# Patient Record
Sex: MALE | Race: WHITE | Employment: OTHER | URBAN - METROPOLITAN AREA
[De-identification: names, ages, dates, MRNs, and addresses within clinical notes are randomized per-mention and may not be internally consistent; named-entity substitution may affect disease eponyms.]

---

## 2017-04-18 PROBLEM — Z86.73 HISTORY OF CVA (CEREBROVASCULAR ACCIDENT): Chronic | Status: ACTIVE | Noted: 2017-04-18

## 2017-04-18 PROBLEM — N18.30 CKD (CHRONIC KIDNEY DISEASE) STAGE 3, GFR 30-59 ML/MIN (HCC): Chronic | Status: ACTIVE | Noted: 2017-04-18

## 2017-04-18 PROBLEM — Z79.01 CHRONIC ANTICOAGULATION: Chronic | Status: ACTIVE | Noted: 2017-04-18

## 2017-04-18 PROBLEM — N40.0 BPH (BENIGN PROSTATIC HYPERPLASIA): Chronic | Status: ACTIVE | Noted: 2017-04-18

## 2017-04-18 PROBLEM — I50.41 SYSTOLIC AND DIASTOLIC CHF, ACUTE (HCC): Status: ACTIVE | Noted: 2017-04-18

## 2017-04-18 PROBLEM — N17.9 AKI (ACUTE KIDNEY INJURY) (HCC): Status: ACTIVE | Noted: 2017-04-18

## 2017-04-18 PROBLEM — I67.9 CEREBROVASCULAR DISEASE: Chronic | Status: ACTIVE | Noted: 2017-04-18

## 2017-04-18 PROBLEM — D64.9 ANEMIA: Status: ACTIVE | Noted: 2017-04-18

## 2017-04-18 PROBLEM — I34.0 MITRAL REGURGITATION: Chronic | Status: ACTIVE | Noted: 2017-04-18

## 2017-04-21 PROBLEM — K22.2 SCHATZKI'S RING: Status: ACTIVE | Noted: 2017-04-21

## 2017-04-21 PROBLEM — K21.9 GERD (GASTROESOPHAGEAL REFLUX DISEASE): Status: ACTIVE | Noted: 2017-04-21

## 2017-04-21 PROBLEM — K26.9 MULTIPLE DUODENAL ULCERS: Status: ACTIVE | Noted: 2017-04-21

## 2017-05-18 PROBLEM — D68.32 WARFARIN-INDUCED COAGULOPATHY (HCC): Status: ACTIVE | Noted: 2017-05-18

## 2017-05-18 PROBLEM — K21.9 GERD (GASTROESOPHAGEAL REFLUX DISEASE): Chronic | Status: ACTIVE | Noted: 2017-04-21

## 2017-05-18 PROBLEM — T45.515A WARFARIN-INDUCED COAGULOPATHY (HCC): Status: ACTIVE | Noted: 2017-05-18

## 2017-05-27 PROBLEM — E78.2 MIXED HYPERLIPIDEMIA: Chronic | Status: ACTIVE | Noted: 2017-05-27

## 2017-05-27 PROBLEM — K22.2 SCHATZKI'S RING: Chronic | Status: ACTIVE | Noted: 2017-04-21

## 2017-05-27 PROBLEM — I50.42 CHRONIC COMBINED SYSTOLIC AND DIASTOLIC CHF (CONGESTIVE HEART FAILURE) (HCC): Chronic | Status: ACTIVE | Noted: 2017-05-27

## 2017-05-27 PROBLEM — K26.9 MULTIPLE DUODENAL ULCERS: Status: RESOLVED | Noted: 2017-04-21 | Resolved: 2017-05-27

## 2017-05-27 PROBLEM — I10 HTN (HYPERTENSION), BENIGN: Chronic | Status: ACTIVE | Noted: 2017-05-27

## 2017-05-28 PROBLEM — K22.2 SCHATZKI'S RING: Chronic | Status: RESOLVED | Noted: 2017-04-21 | Resolved: 2017-05-28

## 2017-05-28 PROBLEM — I34.0 MITRAL REGURGITATION: Chronic | Status: RESOLVED | Noted: 2017-04-18 | Resolved: 2017-05-28

## 2017-05-28 PROBLEM — Z79.01 CHRONIC ANTICOAGULATION: Chronic | Status: RESOLVED | Noted: 2017-04-18 | Resolved: 2017-05-28

## 2018-03-06 DIAGNOSIS — I48.91 ATRIAL FIBRILLATION, UNSPECIFIED TYPE (HCC): ICD-10-CM

## 2018-03-11 DIAGNOSIS — I48.91 ATRIAL FIBRILLATION, UNSPECIFIED TYPE (HCC): ICD-10-CM

## 2018-03-12 ENCOUNTER — HOSPITAL ENCOUNTER (OUTPATIENT)
Dept: PHARMACY | Age: 83
Setting detail: THERAPIES SERIES
Discharge: HOME | End: 2018-03-12
Payer: MEDICARE

## 2018-03-12 VITALS
BODY MASS INDEX: 26.33 KG/M2 | DIASTOLIC BLOOD PRESSURE: 73 MMHG | HEART RATE: 68 BPM | SYSTOLIC BLOOD PRESSURE: 132 MMHG | WEIGHT: 173.2 LBS

## 2018-03-12 DIAGNOSIS — I48.91 ATRIAL FIBRILLATION, UNSPECIFIED TYPE (HCC): ICD-10-CM

## 2018-03-12 LAB — INTERNATIONAL NORMALIZATION RATIO, POC: 2.9

## 2018-03-12 PROCEDURE — 85610 PROTHROMBIN TIME: CPT

## 2018-03-12 PROCEDURE — 99211 OFF/OP EST MAY X REQ PHY/QHP: CPT

## 2018-03-12 RX ORDER — INSULIN GLARGINE 100 [IU]/ML
27 INJECTION, SOLUTION SUBCUTANEOUS EVERY MORNING
COMMUNITY

## 2018-03-28 ENCOUNTER — HOSPITAL ENCOUNTER (OUTPATIENT)
Dept: PHARMACY | Age: 83
Setting detail: THERAPIES SERIES
Discharge: HOME | End: 2018-03-28
Payer: MEDICARE

## 2018-03-28 VITALS
WEIGHT: 176 LBS | BODY MASS INDEX: 26.76 KG/M2 | DIASTOLIC BLOOD PRESSURE: 63 MMHG | HEART RATE: 83 BPM | SYSTOLIC BLOOD PRESSURE: 136 MMHG

## 2018-03-28 DIAGNOSIS — I48.91 ATRIAL FIBRILLATION, UNSPECIFIED TYPE (HCC): ICD-10-CM

## 2018-03-28 LAB — INTERNATIONAL NORMALIZATION RATIO, POC: 2.6

## 2018-03-28 PROCEDURE — 99211 OFF/OP EST MAY X REQ PHY/QHP: CPT

## 2018-03-28 PROCEDURE — 85610 PROTHROMBIN TIME: CPT

## 2018-03-28 RX ORDER — WARFARIN SODIUM 5 MG/1
5 TABLET ORAL DAILY
COMMUNITY
End: 2019-02-25 | Stop reason: SDUPTHER

## 2018-04-12 ENCOUNTER — TELEPHONE (OUTPATIENT)
Dept: ADMINISTRATIVE | Age: 83
End: 2018-04-12

## 2018-04-25 ENCOUNTER — HOSPITAL ENCOUNTER (OUTPATIENT)
Dept: PHARMACY | Age: 83
Setting detail: THERAPIES SERIES
Discharge: HOME | End: 2018-04-25
Payer: MEDICARE

## 2018-04-25 VITALS
BODY MASS INDEX: 26.64 KG/M2 | SYSTOLIC BLOOD PRESSURE: 127 MMHG | HEART RATE: 83 BPM | WEIGHT: 175.2 LBS | DIASTOLIC BLOOD PRESSURE: 66 MMHG

## 2018-04-25 DIAGNOSIS — I48.91 ATRIAL FIBRILLATION, UNSPECIFIED TYPE (HCC): ICD-10-CM

## 2018-04-25 LAB — INTERNATIONAL NORMALIZATION RATIO, POC: 2.3

## 2018-04-25 PROCEDURE — 99211 OFF/OP EST MAY X REQ PHY/QHP: CPT

## 2018-04-25 PROCEDURE — 85610 PROTHROMBIN TIME: CPT

## 2018-05-09 ENCOUNTER — OFFICE VISIT (OUTPATIENT)
Dept: CARDIOLOGY CLINIC | Age: 83
End: 2018-05-09
Payer: MEDICARE

## 2018-05-09 VITALS
RESPIRATION RATE: 16 BRPM | DIASTOLIC BLOOD PRESSURE: 60 MMHG | SYSTOLIC BLOOD PRESSURE: 118 MMHG | WEIGHT: 175.7 LBS | HEIGHT: 68 IN | HEART RATE: 75 BPM | BODY MASS INDEX: 26.63 KG/M2

## 2018-05-09 DIAGNOSIS — I10 HTN (HYPERTENSION), BENIGN: Primary | Chronic | ICD-10-CM

## 2018-05-09 PROCEDURE — 99214 OFFICE O/P EST MOD 30 MIN: CPT | Performed by: INTERNAL MEDICINE

## 2018-05-09 PROCEDURE — 93000 ELECTROCARDIOGRAM COMPLETE: CPT | Performed by: INTERNAL MEDICINE

## 2018-05-23 ENCOUNTER — TELEPHONE (OUTPATIENT)
Dept: PHARMACY | Age: 83
End: 2018-05-23

## 2018-05-23 ENCOUNTER — HOSPITAL ENCOUNTER (OUTPATIENT)
Dept: PHARMACY | Age: 83
Setting detail: THERAPIES SERIES
Discharge: HOME | End: 2018-05-23
Payer: MEDICARE

## 2018-05-23 VITALS
WEIGHT: 175.8 LBS | HEART RATE: 69 BPM | BODY MASS INDEX: 26.73 KG/M2 | SYSTOLIC BLOOD PRESSURE: 124 MMHG | DIASTOLIC BLOOD PRESSURE: 70 MMHG

## 2018-05-23 DIAGNOSIS — I48.91 ATRIAL FIBRILLATION, UNSPECIFIED TYPE (HCC): ICD-10-CM

## 2018-05-23 LAB — INTERNATIONAL NORMALIZATION RATIO, POC: 2.8

## 2018-05-23 PROCEDURE — 99211 OFF/OP EST MAY X REQ PHY/QHP: CPT

## 2018-05-23 PROCEDURE — 85610 PROTHROMBIN TIME: CPT

## 2018-06-20 ENCOUNTER — HOSPITAL ENCOUNTER (OUTPATIENT)
Dept: PHARMACY | Age: 83
Setting detail: THERAPIES SERIES
Discharge: HOME | End: 2018-06-20
Payer: MEDICARE

## 2018-06-20 VITALS
BODY MASS INDEX: 27.06 KG/M2 | SYSTOLIC BLOOD PRESSURE: 130 MMHG | WEIGHT: 178 LBS | DIASTOLIC BLOOD PRESSURE: 66 MMHG | HEART RATE: 73 BPM

## 2018-06-20 DIAGNOSIS — I48.91 ATRIAL FIBRILLATION, UNSPECIFIED TYPE (HCC): ICD-10-CM

## 2018-06-20 LAB — INTERNATIONAL NORMALIZATION RATIO, POC: 2.5

## 2018-06-20 PROCEDURE — 85610 PROTHROMBIN TIME: CPT

## 2018-06-20 PROCEDURE — 99211 OFF/OP EST MAY X REQ PHY/QHP: CPT

## 2018-07-18 ENCOUNTER — HOSPITAL ENCOUNTER (OUTPATIENT)
Dept: PHARMACY | Age: 83
Setting detail: THERAPIES SERIES
Discharge: HOME | End: 2018-07-18
Payer: MEDICARE

## 2018-07-18 VITALS
HEART RATE: 82 BPM | BODY MASS INDEX: 27.16 KG/M2 | DIASTOLIC BLOOD PRESSURE: 63 MMHG | SYSTOLIC BLOOD PRESSURE: 115 MMHG | WEIGHT: 178.6 LBS

## 2018-07-18 DIAGNOSIS — I48.91 ATRIAL FIBRILLATION, UNSPECIFIED TYPE (HCC): ICD-10-CM

## 2018-07-18 LAB — INTERNATIONAL NORMALIZATION RATIO, POC: 2.8

## 2018-07-18 PROCEDURE — 85610 PROTHROMBIN TIME: CPT

## 2018-07-18 PROCEDURE — 99211 OFF/OP EST MAY X REQ PHY/QHP: CPT

## 2018-07-18 NOTE — PROGRESS NOTES
[id number] [facility]    Patient Findings     Negatives:   Signs/symptoms of thrombosis, Signs/symptoms of bleeding, Laboratory test error suspected, Change in health, Change in alcohol use, Change in activity, Upcoming invasive procedure, Emergency department visit, Upcoming dental procedure, Missed doses, Extra doses, Change in medications, Change in diet/appetite, Hospital admission, Bruising, Other complaints         Patient  reports that he has quit smoking. His smoking use included Cigarettes. He has a 60.00 pack-year smoking history. He has never used smokeless tobacco.     Assessment/Plan:  INR is therapeutic at 2.8, goal is 2.0-3.0  Warfarin Dose: continue same, 5 mg daily  Follow Up: 4 weeks    Left message with patient's daughter, Mari Mckeon, with today's results & dosing instructions to continue 5 mg daily. Patient understands dosing directions and information discussed. Dosing schedule and follow up appointment given to patient. Patient acknowledges working in consult agreement with pharmacist as referred by his/her physician.     Everlyn Ave PharmD 7/18/2018 1:58 PM

## 2018-08-01 ENCOUNTER — TELEPHONE (OUTPATIENT)
Dept: PHARMACY | Age: 83
End: 2018-08-01

## 2018-08-15 ENCOUNTER — TELEPHONE (OUTPATIENT)
Dept: PHARMACY | Age: 83
End: 2018-08-15

## 2018-08-15 ENCOUNTER — HOSPITAL ENCOUNTER (OUTPATIENT)
Dept: PHARMACY | Age: 83
Setting detail: THERAPIES SERIES
Discharge: HOME | End: 2018-08-15
Payer: MEDICARE

## 2018-08-15 VITALS
HEART RATE: 73 BPM | DIASTOLIC BLOOD PRESSURE: 61 MMHG | SYSTOLIC BLOOD PRESSURE: 123 MMHG | BODY MASS INDEX: 27.28 KG/M2 | WEIGHT: 179.4 LBS

## 2018-08-15 DIAGNOSIS — I48.91 ATRIAL FIBRILLATION, UNSPECIFIED TYPE (HCC): ICD-10-CM

## 2018-08-15 LAB — INTERNATIONAL NORMALIZATION RATIO, POC: 3

## 2018-08-15 PROCEDURE — 85610 PROTHROMBIN TIME: CPT

## 2018-08-15 PROCEDURE — 99211 OFF/OP EST MAY X REQ PHY/QHP: CPT

## 2018-08-15 NOTE — TELEPHONE ENCOUNTER
Left message for Lisbeth on her cell phone. Patient was in today and his INR was therapeutic at 3.0, with a goal of 2-3. He is to keep everything the same. Continue warfarin 5 mg every day. Follow up in 4 weeks; on Wednesday 9/12 @ 2:00 PM. Electronically signed by RENTISH Mask on 8/15/18 at 2:45 PM. Asked her to call back if she has any questions.  Electronically signed by Haylee Mask on 8/15/18 at 2:46 PM

## 2018-09-12 ENCOUNTER — HOSPITAL ENCOUNTER (OUTPATIENT)
Dept: PHARMACY | Age: 83
Setting detail: THERAPIES SERIES
Discharge: HOME | End: 2018-09-12
Payer: MEDICARE

## 2018-09-12 VITALS
SYSTOLIC BLOOD PRESSURE: 131 MMHG | HEART RATE: 75 BPM | DIASTOLIC BLOOD PRESSURE: 69 MMHG | BODY MASS INDEX: 27.16 KG/M2 | WEIGHT: 178.6 LBS

## 2018-09-12 DIAGNOSIS — I48.91 ATRIAL FIBRILLATION, UNSPECIFIED TYPE (HCC): ICD-10-CM

## 2018-09-12 LAB — INTERNATIONAL NORMALIZATION RATIO, POC: 2.6

## 2018-09-12 PROCEDURE — 85610 PROTHROMBIN TIME: CPT

## 2018-09-12 PROCEDURE — 99211 OFF/OP EST MAY X REQ PHY/QHP: CPT

## 2018-09-12 NOTE — PROGRESS NOTES
[address] [facility]    Patient Findings     Negatives:   Signs/symptoms of thrombosis, Signs/symptoms of bleeding, Laboratory test error suspected, Change in health, Change in alcohol use, Change in activity, Upcoming invasive procedure, Emergency department visit, Upcoming dental procedure, Missed doses, Extra doses, Change in medications, Change in diet/appetite, Hospital admission, Bruising, Other complaints         Patient  reports that he has quit smoking. His smoking use included Cigarettes. He has a 60.00 pack-year smoking history. He has never used smokeless tobacco.     Assessment/Plan:  INR is therapeutic at 2.6, goal is 2-3    Warfarin Dose: same (5mg daily)    Follow Up: 4 weeks  Called daughter, Ekta Garcia, and notified of INR result, warfarin dose, and follow-up. Patient understands dosing directions and information discussed. Dosing schedule and follow up appointment given to patient. Patient acknowledges working in consult agreement with pharmacist as referred by his/her physician.     Vesna Hicks PharmD 9/12/2018 2:13 PM

## 2018-10-10 ENCOUNTER — HOSPITAL ENCOUNTER (OUTPATIENT)
Dept: PHARMACY | Age: 83
Setting detail: THERAPIES SERIES
Discharge: HOME | End: 2018-10-10
Payer: MEDICARE

## 2018-10-10 VITALS
BODY MASS INDEX: 27.22 KG/M2 | HEART RATE: 82 BPM | SYSTOLIC BLOOD PRESSURE: 122 MMHG | DIASTOLIC BLOOD PRESSURE: 70 MMHG | WEIGHT: 179 LBS

## 2018-10-10 DIAGNOSIS — I48.91 ATRIAL FIBRILLATION, UNSPECIFIED TYPE (HCC): ICD-10-CM

## 2018-10-10 LAB — INTERNATIONAL NORMALIZATION RATIO, POC: 2.4

## 2018-10-10 PROCEDURE — 85610 PROTHROMBIN TIME: CPT

## 2018-10-10 PROCEDURE — 99211 OFF/OP EST MAY X REQ PHY/QHP: CPT

## 2018-10-10 NOTE — PROGRESS NOTES
[address] [facility]    Patient Findings     Negatives:   Signs/symptoms of thrombosis, Signs/symptoms of bleeding, Laboratory test error suspected, Change in health, Change in alcohol use, Change in activity, Upcoming invasive procedure, Emergency department visit, Upcoming dental procedure, Missed doses, Extra doses, Change in medications, Change in diet/appetite, Hospital admission, Bruising, Other complaints         Patient  reports that he has quit smoking. His smoking use included Cigarettes. He has a 60.00 pack-year smoking history. He has never used smokeless tobacco.     Assessment/Plan:  INR is therapeutic at 2.4, goal is 2-3. Warfarin Dose: same (5 mg daily)    Follow Up: 4 weeks    I called Lisbeth (daughter) and left a VM with INR, warfarin directions and next appointment. Patient understands dosing directions and information discussed. Dosing schedule and follow up appointment given to patient. Patient acknowledges working in consult agreement with pharmacist as referred by his/her physician.     Seth Edgar PharmD 10/10/2018 2:07 PM

## 2018-11-05 DIAGNOSIS — Z79.01 LONG TERM CURRENT USE OF ANTICOAGULANT: Primary | ICD-10-CM

## 2018-11-07 ENCOUNTER — HOSPITAL ENCOUNTER (OUTPATIENT)
Dept: PHARMACY | Age: 83
Setting detail: THERAPIES SERIES
Discharge: HOME | End: 2018-11-07
Payer: MEDICARE

## 2018-11-07 DIAGNOSIS — I48.91 ATRIAL FIBRILLATION, UNSPECIFIED TYPE (HCC): ICD-10-CM

## 2018-11-07 LAB — INR BLD: 2

## 2018-12-05 ENCOUNTER — HOSPITAL ENCOUNTER (OUTPATIENT)
Dept: PHARMACY | Age: 83
Setting detail: THERAPIES SERIES
Discharge: HOME | End: 2018-12-05
Payer: MEDICARE

## 2018-12-05 VITALS
BODY MASS INDEX: 27 KG/M2 | WEIGHT: 177.6 LBS | HEART RATE: 76 BPM | DIASTOLIC BLOOD PRESSURE: 84 MMHG | SYSTOLIC BLOOD PRESSURE: 137 MMHG

## 2018-12-05 DIAGNOSIS — I48.91 ATRIAL FIBRILLATION, UNSPECIFIED TYPE (HCC): ICD-10-CM

## 2018-12-05 LAB — INTERNATIONAL NORMALIZATION RATIO, POC: 2.6

## 2018-12-05 PROCEDURE — 99211 OFF/OP EST MAY X REQ PHY/QHP: CPT

## 2018-12-05 PROCEDURE — 85610 PROTHROMBIN TIME: CPT

## 2018-12-17 ENCOUNTER — OFFICE VISIT (OUTPATIENT)
Dept: CARDIOLOGY CLINIC | Age: 83
End: 2018-12-17
Payer: MEDICARE

## 2018-12-17 VITALS
WEIGHT: 176.8 LBS | BODY MASS INDEX: 26.19 KG/M2 | HEIGHT: 69 IN | SYSTOLIC BLOOD PRESSURE: 120 MMHG | HEART RATE: 85 BPM | RESPIRATION RATE: 16 BRPM | DIASTOLIC BLOOD PRESSURE: 60 MMHG

## 2018-12-17 DIAGNOSIS — I10 HTN (HYPERTENSION), BENIGN: Primary | Chronic | ICD-10-CM

## 2018-12-17 PROCEDURE — 99214 OFFICE O/P EST MOD 30 MIN: CPT | Performed by: INTERNAL MEDICINE

## 2018-12-17 PROCEDURE — 93000 ELECTROCARDIOGRAM COMPLETE: CPT | Performed by: INTERNAL MEDICINE

## 2018-12-17 RX ORDER — LORATADINE 10 MG/1
10 TABLET ORAL DAILY PRN
COMMUNITY

## 2019-01-07 ENCOUNTER — HOSPITAL ENCOUNTER (OUTPATIENT)
Dept: PHARMACY | Age: 84
Setting detail: THERAPIES SERIES
Discharge: HOME | End: 2019-01-07
Payer: MEDICARE

## 2019-01-07 VITALS
WEIGHT: 178.4 LBS | DIASTOLIC BLOOD PRESSURE: 64 MMHG | SYSTOLIC BLOOD PRESSURE: 135 MMHG | HEART RATE: 80 BPM | BODY MASS INDEX: 26.35 KG/M2

## 2019-01-07 DIAGNOSIS — I48.91 ATRIAL FIBRILLATION, UNSPECIFIED TYPE (HCC): ICD-10-CM

## 2019-01-07 LAB — INTERNATIONAL NORMALIZATION RATIO, POC: 3

## 2019-01-07 PROCEDURE — 85610 PROTHROMBIN TIME: CPT

## 2019-01-07 PROCEDURE — 99211 OFF/OP EST MAY X REQ PHY/QHP: CPT

## 2019-02-04 ENCOUNTER — HOSPITAL ENCOUNTER (OUTPATIENT)
Dept: PHARMACY | Age: 84
Setting detail: THERAPIES SERIES
Discharge: HOME | End: 2019-02-04
Payer: MEDICARE

## 2019-02-04 VITALS
SYSTOLIC BLOOD PRESSURE: 125 MMHG | HEART RATE: 84 BPM | DIASTOLIC BLOOD PRESSURE: 67 MMHG | BODY MASS INDEX: 26.23 KG/M2 | WEIGHT: 177.6 LBS

## 2019-02-04 DIAGNOSIS — I48.91 ATRIAL FIBRILLATION, UNSPECIFIED TYPE (HCC): ICD-10-CM

## 2019-02-04 LAB — INTERNATIONAL NORMALIZATION RATIO, POC: 3

## 2019-02-04 PROCEDURE — 85610 PROTHROMBIN TIME: CPT

## 2019-02-04 PROCEDURE — 99211 OFF/OP EST MAY X REQ PHY/QHP: CPT

## 2019-02-25 RX ORDER — WARFARIN SODIUM 5 MG/1
TABLET ORAL
Qty: 30 TABLET | Refills: 11 | Status: SHIPPED | OUTPATIENT
Start: 2019-02-25 | End: 2020-01-01 | Stop reason: SDUPTHER

## 2019-03-04 ENCOUNTER — HOSPITAL ENCOUNTER (OUTPATIENT)
Dept: PHARMACY | Age: 84
Setting detail: THERAPIES SERIES
Discharge: HOME | End: 2019-03-04
Payer: MEDICARE

## 2019-03-04 VITALS
BODY MASS INDEX: 26.14 KG/M2 | SYSTOLIC BLOOD PRESSURE: 150 MMHG | WEIGHT: 177 LBS | HEART RATE: 81 BPM | DIASTOLIC BLOOD PRESSURE: 74 MMHG

## 2019-03-04 DIAGNOSIS — I48.91 ATRIAL FIBRILLATION, UNSPECIFIED TYPE (HCC): ICD-10-CM

## 2019-03-04 LAB — INTERNATIONAL NORMALIZATION RATIO, POC: 2.3

## 2019-03-04 PROCEDURE — 99211 OFF/OP EST MAY X REQ PHY/QHP: CPT

## 2019-03-04 PROCEDURE — 85610 PROTHROMBIN TIME: CPT

## 2019-04-01 ENCOUNTER — HOSPITAL ENCOUNTER (OUTPATIENT)
Dept: PHARMACY | Age: 84
Setting detail: THERAPIES SERIES
Discharge: HOME | End: 2019-04-01
Payer: MEDICARE

## 2019-04-01 VITALS
HEART RATE: 90 BPM | DIASTOLIC BLOOD PRESSURE: 72 MMHG | BODY MASS INDEX: 26.14 KG/M2 | SYSTOLIC BLOOD PRESSURE: 132 MMHG | WEIGHT: 177 LBS

## 2019-04-01 DIAGNOSIS — I48.91 ATRIAL FIBRILLATION, UNSPECIFIED TYPE (HCC): ICD-10-CM

## 2019-04-01 LAB — INTERNATIONAL NORMALIZATION RATIO, POC: 2.4

## 2019-04-01 PROCEDURE — 85610 PROTHROMBIN TIME: CPT

## 2019-04-01 PROCEDURE — 99211 OFF/OP EST MAY X REQ PHY/QHP: CPT

## 2019-04-29 ENCOUNTER — HOSPITAL ENCOUNTER (OUTPATIENT)
Dept: PHARMACY | Age: 84
Setting detail: THERAPIES SERIES
Discharge: HOME | End: 2019-04-29
Payer: MEDICARE

## 2019-04-29 VITALS
SYSTOLIC BLOOD PRESSURE: 136 MMHG | WEIGHT: 180.6 LBS | BODY MASS INDEX: 26.67 KG/M2 | DIASTOLIC BLOOD PRESSURE: 71 MMHG | HEART RATE: 82 BPM

## 2019-04-29 DIAGNOSIS — I48.91 ATRIAL FIBRILLATION, UNSPECIFIED TYPE (HCC): ICD-10-CM

## 2019-04-29 LAB — INTERNATIONAL NORMALIZATION RATIO, POC: 2.3

## 2019-04-29 PROCEDURE — 99211 OFF/OP EST MAY X REQ PHY/QHP: CPT

## 2019-04-29 PROCEDURE — 85610 PROTHROMBIN TIME: CPT

## 2019-04-29 NOTE — PROGRESS NOTES
[address] [facility]    Patient Findings     Positives:   Change in health (REPORTS SOME DIZZINESS UPON STANDING)    Negatives:   Signs/symptoms of thrombosis, Signs/symptoms of bleeding, Laboratory test error suspected, Change in alcohol use, Change in activity, Upcoming invasive procedure, Emergency department visit, Upcoming dental procedure, Missed doses, Extra doses, Change in medications, Change in diet/appetite, Hospital admission, Bruising, Other complaints         Patient  reports that he has quit smoking. His smoking use included cigarettes. He has a 60.00 pack-year smoking history. He has never used smokeless tobacco.     Assessment/Plan:  Warfarin indication: atrial fibrillation      INR today is therapeutic at 2.3, goal is 2-3. Warfarin Dose: same (5 mg daily)    Follow Up: 4 weeks    Patient understands dosing directions and information discussed. Dosing schedule and follow up appointment given to patient. Patient acknowledges working in consult agreement with pharmacist as referred by his/her physician.   Called and left a  for his daughter, Louie Gonzales (discussed INR, vitals, warfarin dosing and date of next visit)  Elena Rodrigues PharmD 4/29/2019 1:43 PM

## 2019-05-01 ENCOUNTER — TELEPHONE (OUTPATIENT)
Dept: ADMINISTRATIVE | Age: 84
End: 2019-05-01

## 2019-05-08 ENCOUNTER — OFFICE VISIT (OUTPATIENT)
Dept: CARDIOLOGY CLINIC | Age: 84
End: 2019-05-08
Payer: MEDICARE

## 2019-05-08 VITALS
DIASTOLIC BLOOD PRESSURE: 62 MMHG | HEIGHT: 69 IN | BODY MASS INDEX: 26.69 KG/M2 | SYSTOLIC BLOOD PRESSURE: 108 MMHG | RESPIRATION RATE: 16 BRPM | WEIGHT: 180.2 LBS | HEART RATE: 80 BPM

## 2019-05-08 DIAGNOSIS — I25.119 CORONARY ARTERY DISEASE WITH ANGINA PECTORIS, UNSPECIFIED VESSEL OR LESION TYPE, UNSPECIFIED WHETHER NATIVE OR TRANSPLANTED HEART (HCC): Primary | Chronic | ICD-10-CM

## 2019-05-08 PROCEDURE — 99214 OFFICE O/P EST MOD 30 MIN: CPT | Performed by: INTERNAL MEDICINE

## 2019-05-08 PROCEDURE — 93000 ELECTROCARDIOGRAM COMPLETE: CPT | Performed by: INTERNAL MEDICINE

## 2019-05-08 NOTE — PROGRESS NOTES
denies any chest pain or shortness of breath. Patient denies any edema. 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 Current Outpatient Medications   Medication Sig Dispense Refill    warfarin (COUMADIN) 5 MG tablet Take 1 tablet daily or as directed by Anticoagulation Clinic. 30 tablet 11    loratadine (CLARITIN) 10 MG tablet Take 10 mg by mouth daily      insulin glargine (LANTUS) 100 UNIT/ML injection vial Inject 25 Units into the skin every morning       acetaminophen (TYLENOL) 325 MG tablet Take 2 tablets by mouth every 4 hours as needed for Pain or Fever 120 tablet 3    bumetanide (BUMEX) 1 MG tablet Take 1 tablet by mouth 2 times daily 30 tablet 3    isosorbide mononitrate (IMDUR) 30 MG extended release tablet Take 1 tablet by mouth daily 30 tablet 11    hydrALAZINE (APRESOLINE) 10 MG tablet Take 1 tablet by mouth every 8 hours 90 tablet 11    pantoprazole (PROTONIX) 40 MG tablet Take 1 tablet by mouth every morning (before breakfast) 30 tablet 11    vitamin D (CHOLECALCIFEROL) 1000 UNIT TABS tablet Take 2,000 Units by mouth daily       levothyroxine (SYNTHROID) 88 MCG tablet Take 88 mcg by mouth Daily      atorvastatin (LIPITOR) 40 MG tablet Take 40 mg by mouth nightly       glipiZIDE (GLUCOTROL) 10 MG tablet Take 2 tablets by mouth 2 times daily (before meals). 60 tablet 3    metoprolol (LOPRESSOR) 25 MG tablet Take 1 tablet by mouth 2 times daily. 60 tablet 3     No current facility-administered medications for this visit.         Social History Socioeconomic History    Marital status:      Spouse name: Not on file    Number of children: 3    Years of education: 15    Highest education level: Not on file   Occupational History    Not on file   Social Needs    Financial resource strain: Not on file    Food insecurity:     Worry: Not on file     Inability: Not on file    Transportation needs:     Medical: Not on file     Non-medical: Not on file   Tobacco Use    Smoking status: Former Smoker     Packs/day: 1.50     Years: 40.00     Pack years: 60.00     Types: Cigarettes    Smokeless tobacco: Never Used   Substance and Sexual Activity    Alcohol use: No     Alcohol/week: 0.0 oz    Drug use: No    Sexual activity: Not on file   Lifestyle    Physical activity:     Days per week: Not on file     Minutes per session: Not on file    Stress: Not on file   Relationships    Social connections:     Talks on phone: Not on file     Gets together: Not on file     Attends Orthodoxy service: Not on file     Active member of club or organization: Not on file     Attends meetings of clubs or organizations: Not on file     Relationship status: Not on file    Intimate partner violence:     Fear of current or ex partner: Not on file     Emotionally abused: Not on file     Physically abused: Not on file     Forced sexual activity: Not on file   Other Topics Concern    Not on file   Social History Narrative    Not on file       Family History   Problem Relation Age of Onset    Heart Disease Mother     Heart Disease Father        Review of Systems:  Heart: as above   Lungs: as above   Eyes: denies changes in vision or discharge. Ears: denies changes in hearing or pain. Nose: denies epistaxis or masses   Throat: denies sore throat or trouble swallowing. Neuro: denies numbness, tingling, tremors. Skin: denies rashes or itching.    : denies hematuria, dysuria   GI: denies vomiting, diarrhea   Psych: denies mood changed, anxiety, depression. all others negative. Physical Exam   /62   Pulse 80   Resp 16   Ht 5' 9\" (1.753 m)   Wt 180 lb 3.2 oz (81.7 kg)   BMI 26.61 kg/m²   Constitutional: Oriented to person, place, and time. Well-developed and well-nourished. No distress. Head: Normocephalic and atraumatic. Eyes: EOM are normal. Pupils are equal, round, and reactive to light. Neck: Normal range of motion. Neck supple. No hepatojugular reflux and no JVD present. Carotid bruit is not present. No tracheal deviation present. No thyromegaly present. Cardiovascular: Normal rate, regular rhythm, SOFIA 2/6. Pulmonary/Chest: Effort normal and breath sounds normal. No respiratory distress. No wheezes. No rales. No tenderness. Abdominal: Soft. Bowel sounds are normal. No distension and no mass. No tenderness. No rebound and no guarding. Musculoskeletal: Normal range of motion. No edema and no tenderness. Lymphadenopathy:   No cervical adenopathy. Neurological: Alert and oriented to person, place, and time. Skin: Skin is warm and dry. No rash noted. Not diaphoretic. No erythema. Psychiatric: Normal mood and affect. Behavior is normal.     EKG:  Normal sinus rhythm, first degree AVB, nonspecific ST-T wave changes.       Echo Summary 4/18/17:   Left ventricular internal dimensions and wall thickness are normal.   The inferior and inferolateral walls are thin and akinetic, other walls are hypokinetic.   Overall left ventricular systolic function is mildly impaired.   The ejection fraction is estimated to be 40 - 45% by the biplane method of disks.   There is Doppler evidence for stage II diastolic dysfunction.   The left atrium is moderately enlarged as determined by the left atrial volume index.   The right atrium appears to be enlarged.   Mitral leaflet excursion is decreased consistent with a low flow state.   Severe, centrally-directed mitral regurgitation is present.   The aortic valve appears moderately sclerotic.   Mild aortic regurgitation is noted.   Severe tricuspid regurgitation is present.   Estimated right ventricular systolic pressure is 70 - 75 mmHg and severely elevated.   The inferior vena cava is dilated with decreased respiratory variation suggesting elevated right atrial pressures. ASSESSMENT AND PLAN:  1. CHF: Mixed etiology with systolic, diastolic, ride-sided and valvular heart disease issues. Stable exam and symptoms currently. Very diligent observation of his volume status is needed. Same medications for now. 2. CAD-CABG: Stable. Continue same medical treatment. 3. Valvular Heart Disease:    Severe functional mitral regurgitation and severe tricuspid regurgitation on 4/17 echo. See above. 4. CVA: Warfarin. Pema Fernandez D.O.   Cardiologist  Cardiology, [facility]

## 2019-05-30 ENCOUNTER — HOSPITAL ENCOUNTER (OUTPATIENT)
Dept: PHARMACY | Age: 84
Setting detail: THERAPIES SERIES
Discharge: HOME | End: 2019-05-30
Payer: MEDICARE

## 2019-05-30 VITALS
DIASTOLIC BLOOD PRESSURE: 55 MMHG | WEIGHT: 179.4 LBS | HEART RATE: 74 BPM | BODY MASS INDEX: 26.49 KG/M2 | SYSTOLIC BLOOD PRESSURE: 114 MMHG

## 2019-05-30 DIAGNOSIS — I48.91 ATRIAL FIBRILLATION, UNSPECIFIED TYPE (HCC): ICD-10-CM

## 2019-05-30 LAB — INTERNATIONAL NORMALIZATION RATIO, POC: 2.6

## 2019-05-30 PROCEDURE — 99211 OFF/OP EST MAY X REQ PHY/QHP: CPT

## 2019-05-30 PROCEDURE — 85610 PROTHROMBIN TIME: CPT

## 2019-05-30 NOTE — PROGRESS NOTES
[address] [facility]    Patient Findings     Negatives:   Signs/symptoms of thrombosis, Signs/symptoms of bleeding, Laboratory test error suspected, Change in health, Change in alcohol use, Change in activity, Upcoming invasive procedure, Emergency department visit, Upcoming dental procedure, Missed doses, Extra doses, Change in medications, Change in diet/appetite, Hospital admission, Bruising, Other complaints         Patient  reports that he has quit smoking. His smoking use included cigarettes. He has a 60.00 pack-year smoking history. He has never used smokeless tobacco.     Assessment/Plan:  Warfarin indication: atrial fibrillation      INR today is therapeutic at 2.6, goal is 2-3. Warfarin Dose: same (5 mg daily)    Follow Up: 4 weeks    Patient understands dosing directions and information discussed. Dosing schedule and follow up appointment given to patient. Patient acknowledges working in consult agreement with pharmacist as referred by his/her physician.   Called and left a  for his daughter, Lisbeth (discussed INR, vitals, warfarin dosing and date of next visit)    Ashley Law PharmD 5/30/2019 1:52 PM

## 2019-06-27 ENCOUNTER — HOSPITAL ENCOUNTER (OUTPATIENT)
Dept: PHARMACY | Age: 84
Setting detail: THERAPIES SERIES
Discharge: HOME | End: 2019-06-27
Payer: MEDICARE

## 2019-06-27 VITALS
BODY MASS INDEX: 26.61 KG/M2 | WEIGHT: 180.2 LBS | DIASTOLIC BLOOD PRESSURE: 70 MMHG | SYSTOLIC BLOOD PRESSURE: 138 MMHG | HEART RATE: 80 BPM

## 2019-06-27 DIAGNOSIS — I48.0 PAROXYSMAL ATRIAL FIBRILLATION (HCC): ICD-10-CM

## 2019-06-27 LAB — INTERNATIONAL NORMALIZATION RATIO, POC: 2.2

## 2019-06-27 PROCEDURE — 85610 PROTHROMBIN TIME: CPT

## 2019-06-27 PROCEDURE — 99211 OFF/OP EST MAY X REQ PHY/QHP: CPT

## 2019-06-27 NOTE — PROGRESS NOTES
[address] [facility]    Patient Findings     Negatives:   Signs/symptoms of thrombosis, Signs/symptoms of bleeding, Laboratory test error suspected, Change in health, Change in alcohol use, Change in activity, Upcoming invasive procedure, Emergency department visit, Upcoming dental procedure, Missed doses, Extra doses, Change in medications, Change in diet/appetite, Hospital admission, Bruising, Other complaints    Comments:   PCP 2 WKS AGO           Patient  reports that he has quit smoking. His smoking use included cigarettes. He has a 60.00 pack-year smoking history. He has never used smokeless tobacco.     Assessment/Plan:  Warfarin indication: AFIB      INR today is therapeutic at 2.2, goal is 2-3    Warfarin Dose: SAME (5MG DAILY)    Follow Up: 4 weeks    Called and left a VM for his daughter, Lisbeth (discussed INR, warfarin dosing and date of next visit)    Patient understands dosing directions and information discussed. Dosing schedule and follow up appointment given to patient. Patient acknowledges working in consult agreement with pharmacist as referred by his/her physician.     Tata Geiger PharmD 6/27/2019 1:44 PM

## 2019-07-25 ENCOUNTER — HOSPITAL ENCOUNTER (OUTPATIENT)
Dept: PHARMACY | Age: 84
Setting detail: THERAPIES SERIES
Discharge: HOME | End: 2019-07-25
Payer: MEDICARE

## 2019-07-25 VITALS
DIASTOLIC BLOOD PRESSURE: 72 MMHG | WEIGHT: 180 LBS | HEART RATE: 73 BPM | BODY MASS INDEX: 26.58 KG/M2 | SYSTOLIC BLOOD PRESSURE: 116 MMHG

## 2019-07-25 DIAGNOSIS — I48.0 PAROXYSMAL ATRIAL FIBRILLATION (HCC): ICD-10-CM

## 2019-07-25 LAB — INTERNATIONAL NORMALIZATION RATIO, POC: 3.2

## 2019-07-25 PROCEDURE — 99211 OFF/OP EST MAY X REQ PHY/QHP: CPT

## 2019-07-25 PROCEDURE — 85610 PROTHROMBIN TIME: CPT

## 2019-07-25 NOTE — PROGRESS NOTES
[address] [facility]    Patient Findings     Negatives:   Signs/symptoms of thrombosis, Signs/symptoms of bleeding, Laboratory test error suspected, Change in health, Change in alcohol use, Change in activity, Upcoming invasive procedure, Emergency department visit, Upcoming dental procedure, Missed doses, Extra doses, Change in medications, Change in diet/appetite, Hospital admission, Bruising, Other complaints         Patient  reports that he has quit smoking. His smoking use included cigarettes. He has a 60.00 pack-year smoking history. He has never used smokeless tobacco.     Assessment/Plan:  Warfarin indication: AFIB      INR today is SUPRAtherapeutic at 3.2, goal is 2-3. UNKNOWN CAUSE. Patient has been stable on this dose for months    Warfarin Dose: HOLD TODAY THEN continue SAME (5MG DAILY)    Follow Up: 3 weeks    Patient understands dosing directions and information discussed. Dosing schedule and follow up appointment given to patient. Patient acknowledges working in consult agreement with pharmacist as referred by his/her physician. Called and left a VM for his daughter, Lisbeth (discussed INR, vitals, warfarin dosing and date of next visit). Also called [facility] to let them know of the dose change marty.      Eunice Layne PharmD 7/25/2019 2:04 PM

## 2019-08-15 ENCOUNTER — HOSPITAL ENCOUNTER (OUTPATIENT)
Dept: PHARMACY | Age: 84
Setting detail: THERAPIES SERIES
Discharge: HOME | End: 2019-08-15
Payer: MEDICARE

## 2019-08-15 VITALS
SYSTOLIC BLOOD PRESSURE: 129 MMHG | HEART RATE: 80 BPM | WEIGHT: 182 LBS | BODY MASS INDEX: 26.88 KG/M2 | DIASTOLIC BLOOD PRESSURE: 70 MMHG

## 2019-08-15 DIAGNOSIS — I48.0 PAROXYSMAL ATRIAL FIBRILLATION (HCC): ICD-10-CM

## 2019-08-15 LAB — INTERNATIONAL NORMALIZATION RATIO, POC: 2.8

## 2019-08-15 PROCEDURE — 85610 PROTHROMBIN TIME: CPT

## 2019-08-15 PROCEDURE — 99211 OFF/OP EST MAY X REQ PHY/QHP: CPT

## 2019-08-15 NOTE — PROGRESS NOTES
[address] [facility]    Patient Findings     Positives:   Change in medications (LANTUS INCREASED FROM 25 UNITS TO 27 UNITS DAILY)    Negatives:   Signs/symptoms of thrombosis, Signs/symptoms of bleeding, Laboratory test error suspected, Change in health, Change in alcohol use, Change in activity, Upcoming invasive procedure, Emergency department visit, Upcoming dental procedure, Missed doses, Extra doses, Change in diet/appetite, Hospital admission, Bruising, Other complaints         Patient  reports that he has quit smoking. His smoking use included cigarettes. He has a 60.00 pack-year smoking history. He has never used smokeless tobacco.     Assessment/Plan:  Warfarin indication: atrial fibrillation      INR today is therapeutic at 2.8, goal is 2-3. Warfarin Dose: same (5 mg daily)    Follow Up: 4 weeks    Patient understands dosing directions and information discussed. Dosing schedule and follow up appointment given to patient. Patient acknowledges working in consult agreement with pharmacist as referred by his/her physician. Called and left a  for daughter, Tono Barrientos, with warfarin dose, vitals and date of next appt.   Alexia Watts PharmD 8/15/2019 1:55 PM

## 2019-09-12 ENCOUNTER — HOSPITAL ENCOUNTER (OUTPATIENT)
Dept: PHARMACY | Age: 84
Setting detail: THERAPIES SERIES
Discharge: HOME | End: 2019-09-12
Payer: MEDICARE

## 2019-09-12 VITALS
BODY MASS INDEX: 25.99 KG/M2 | HEART RATE: 77 BPM | WEIGHT: 176 LBS | DIASTOLIC BLOOD PRESSURE: 70 MMHG | SYSTOLIC BLOOD PRESSURE: 127 MMHG

## 2019-09-12 DIAGNOSIS — I48.0 PAROXYSMAL ATRIAL FIBRILLATION (HCC): ICD-10-CM

## 2019-09-12 LAB — INTERNATIONAL NORMALIZATION RATIO, POC: 3.4

## 2019-09-12 PROCEDURE — 99211 OFF/OP EST MAY X REQ PHY/QHP: CPT

## 2019-09-12 PROCEDURE — 85610 PROTHROMBIN TIME: CPT

## 2019-09-26 ENCOUNTER — HOSPITAL ENCOUNTER (OUTPATIENT)
Dept: PHARMACY | Age: 84
Setting detail: THERAPIES SERIES
Discharge: HOME | End: 2019-09-26
Payer: MEDICARE

## 2019-09-26 VITALS
HEART RATE: 84 BPM | SYSTOLIC BLOOD PRESSURE: 129 MMHG | BODY MASS INDEX: 26.11 KG/M2 | WEIGHT: 176.8 LBS | DIASTOLIC BLOOD PRESSURE: 67 MMHG

## 2019-09-26 DIAGNOSIS — I48.0 PAROXYSMAL ATRIAL FIBRILLATION (HCC): ICD-10-CM

## 2019-09-26 LAB — INTERNATIONAL NORMALIZATION RATIO, POC: 3.3

## 2019-09-26 PROCEDURE — 99211 OFF/OP EST MAY X REQ PHY/QHP: CPT

## 2019-09-26 PROCEDURE — 85610 PROTHROMBIN TIME: CPT

## 2019-10-10 ENCOUNTER — HOSPITAL ENCOUNTER (OUTPATIENT)
Dept: PHARMACY | Age: 84
Setting detail: THERAPIES SERIES
Discharge: HOME | End: 2019-10-10
Payer: MEDICARE

## 2019-10-10 VITALS
WEIGHT: 177 LBS | DIASTOLIC BLOOD PRESSURE: 71 MMHG | SYSTOLIC BLOOD PRESSURE: 128 MMHG | BODY MASS INDEX: 26.14 KG/M2 | HEART RATE: 84 BPM

## 2019-10-10 DIAGNOSIS — I48.0 PAROXYSMAL ATRIAL FIBRILLATION (HCC): ICD-10-CM

## 2019-10-10 LAB — INTERNATIONAL NORMALIZATION RATIO, POC: 2.2

## 2019-10-10 PROCEDURE — 85610 PROTHROMBIN TIME: CPT

## 2019-10-10 PROCEDURE — 99211 OFF/OP EST MAY X REQ PHY/QHP: CPT

## 2019-10-24 ENCOUNTER — HOSPITAL ENCOUNTER (OUTPATIENT)
Dept: PHARMACY | Age: 84
Setting detail: THERAPIES SERIES
Discharge: HOME | End: 2019-10-24
Payer: MEDICARE

## 2019-10-24 VITALS
BODY MASS INDEX: 26.2 KG/M2 | DIASTOLIC BLOOD PRESSURE: 68 MMHG | WEIGHT: 177.4 LBS | SYSTOLIC BLOOD PRESSURE: 128 MMHG | HEART RATE: 71 BPM

## 2019-10-24 DIAGNOSIS — I48.0 PAROXYSMAL ATRIAL FIBRILLATION (HCC): ICD-10-CM

## 2019-10-24 LAB — INTERNATIONAL NORMALIZATION RATIO, POC: 2.3

## 2019-10-24 PROCEDURE — 99211 OFF/OP EST MAY X REQ PHY/QHP: CPT

## 2019-10-24 PROCEDURE — 85610 PROTHROMBIN TIME: CPT

## 2019-11-21 ENCOUNTER — HOSPITAL ENCOUNTER (OUTPATIENT)
Dept: PHARMACY | Age: 84
Setting detail: THERAPIES SERIES
Discharge: HOME | End: 2019-11-21
Payer: MEDICARE

## 2019-11-21 VITALS
DIASTOLIC BLOOD PRESSURE: 65 MMHG | SYSTOLIC BLOOD PRESSURE: 133 MMHG | HEART RATE: 75 BPM | BODY MASS INDEX: 26.37 KG/M2 | WEIGHT: 178.6 LBS

## 2019-11-21 DIAGNOSIS — I48.0 PAROXYSMAL ATRIAL FIBRILLATION (HCC): ICD-10-CM

## 2019-11-21 LAB — INTERNATIONAL NORMALIZATION RATIO, POC: 2.3

## 2019-11-21 PROCEDURE — 99211 OFF/OP EST MAY X REQ PHY/QHP: CPT

## 2019-11-21 PROCEDURE — 85610 PROTHROMBIN TIME: CPT

## 2019-11-27 ENCOUNTER — TELEPHONE (OUTPATIENT)
Dept: PHARMACY | Age: 84
End: 2019-11-27

## 2019-11-27 DIAGNOSIS — I48.0 PAROXYSMAL ATRIAL FIBRILLATION (HCC): ICD-10-CM

## 2019-12-17 ENCOUNTER — OFFICE VISIT (OUTPATIENT)
Dept: CARDIOLOGY CLINIC | Age: 84
End: 2019-12-17
Payer: MEDICARE

## 2019-12-17 ENCOUNTER — TELEPHONE (OUTPATIENT)
Dept: CARDIOLOGY CLINIC | Age: 84
End: 2019-12-17

## 2019-12-17 VITALS
RESPIRATION RATE: 16 BRPM | DIASTOLIC BLOOD PRESSURE: 64 MMHG | HEART RATE: 68 BPM | SYSTOLIC BLOOD PRESSURE: 108 MMHG | WEIGHT: 180.2 LBS | BODY MASS INDEX: 27.31 KG/M2 | HEIGHT: 68 IN

## 2019-12-17 DIAGNOSIS — I25.119 CORONARY ARTERY DISEASE WITH ANGINA PECTORIS, UNSPECIFIED VESSEL OR LESION TYPE, UNSPECIFIED WHETHER NATIVE OR TRANSPLANTED HEART (HCC): Primary | Chronic | ICD-10-CM

## 2019-12-17 PROCEDURE — 99214 OFFICE O/P EST MOD 30 MIN: CPT | Performed by: INTERNAL MEDICINE

## 2019-12-17 PROCEDURE — 93000 ELECTROCARDIOGRAM COMPLETE: CPT | Performed by: INTERNAL MEDICINE

## 2019-12-19 ENCOUNTER — HOSPITAL ENCOUNTER (OUTPATIENT)
Dept: PHARMACY | Age: 84
Setting detail: THERAPIES SERIES
Discharge: HOME | End: 2019-12-19
Payer: MEDICARE

## 2019-12-19 VITALS
DIASTOLIC BLOOD PRESSURE: 68 MMHG | BODY MASS INDEX: 27.4 KG/M2 | SYSTOLIC BLOOD PRESSURE: 126 MMHG | HEART RATE: 79 BPM | WEIGHT: 180.2 LBS

## 2019-12-19 DIAGNOSIS — I48.0 PAROXYSMAL ATRIAL FIBRILLATION (HCC): ICD-10-CM

## 2019-12-19 LAB — INTERNATIONAL NORMALIZATION RATIO, POC: 1.7

## 2019-12-19 PROCEDURE — 85610 PROTHROMBIN TIME: CPT

## 2019-12-19 PROCEDURE — 99211 OFF/OP EST MAY X REQ PHY/QHP: CPT

## 2019-12-19 RX ORDER — ISOSORBIDE MONONITRATE 30 MG/1
30 TABLET, EXTENDED RELEASE ORAL DAILY
COMMUNITY
End: 2020-01-02 | Stop reason: CLARIF

## 2019-12-19 RX ORDER — DOCUSATE SODIUM 100 MG/1
100 CAPSULE, LIQUID FILLED ORAL EVERY MORNING
COMMUNITY

## 2019-12-19 RX ORDER — MULTIVIT-MIN/IRON/FOLIC ACID/K 18-600-40
2000 CAPSULE ORAL DAILY
COMMUNITY

## 2020-01-01 ENCOUNTER — TELEPHONE (OUTPATIENT)
Dept: CARDIOLOGY CLINIC | Age: 85
End: 2020-01-01

## 2020-01-01 ENCOUNTER — HOSPITAL ENCOUNTER (OUTPATIENT)
Dept: PHARMACY | Age: 85
Setting detail: THERAPIES SERIES
Discharge: HOME | End: 2020-12-07
Payer: MEDICARE

## 2020-01-01 ENCOUNTER — HOSPITAL ENCOUNTER (OUTPATIENT)
Dept: PHARMACY | Age: 85
Setting detail: THERAPIES SERIES
Discharge: HOME | End: 2020-08-31
Payer: MEDICARE

## 2020-01-01 ENCOUNTER — TELEPHONE (OUTPATIENT)
Dept: PHARMACY | Age: 85
End: 2020-01-01

## 2020-01-01 ENCOUNTER — HOSPITAL ENCOUNTER (OUTPATIENT)
Dept: PHARMACY | Age: 85
Setting detail: THERAPIES SERIES
Discharge: HOME | End: 2020-04-09
Payer: MEDICARE

## 2020-01-01 ENCOUNTER — HOSPITAL ENCOUNTER (OUTPATIENT)
Dept: PHARMACY | Age: 85
Setting detail: THERAPIES SERIES
Discharge: HOME | End: 2020-11-24
Payer: MEDICARE

## 2020-01-01 ENCOUNTER — HOSPITAL ENCOUNTER (OUTPATIENT)
Dept: PHARMACY | Age: 85
Setting detail: THERAPIES SERIES
Discharge: HOME | End: 2020-08-10
Payer: MEDICARE

## 2020-01-01 ENCOUNTER — HOSPITAL ENCOUNTER (OUTPATIENT)
Dept: PHARMACY | Age: 85
Setting detail: THERAPIES SERIES
Discharge: HOME | End: 2020-12-28
Payer: MEDICARE

## 2020-01-01 ENCOUNTER — HOSPITAL ENCOUNTER (OUTPATIENT)
Dept: PHARMACY | Age: 85
Setting detail: THERAPIES SERIES
Discharge: HOME | End: 2020-02-13
Payer: MEDICARE

## 2020-01-01 ENCOUNTER — HOSPITAL ENCOUNTER (OUTPATIENT)
Dept: PHARMACY | Age: 85
Setting detail: THERAPIES SERIES
Discharge: HOME | End: 2020-07-27
Payer: MEDICARE

## 2020-01-01 ENCOUNTER — HOSPITAL ENCOUNTER (OUTPATIENT)
Age: 85
Discharge: HOME | End: 2020-10-28
Payer: MEDICARE

## 2020-01-01 ENCOUNTER — HOSPITAL ENCOUNTER (OUTPATIENT)
Dept: PHARMACY | Age: 85
Setting detail: THERAPIES SERIES
Discharge: HOME | End: 2020-10-26
Payer: MEDICARE

## 2020-01-01 ENCOUNTER — HOSPITAL ENCOUNTER (OUTPATIENT)
Age: 85
Discharge: HOME | End: 2020-09-30
Payer: MEDICARE

## 2020-01-01 ENCOUNTER — HOSPITAL ENCOUNTER (OUTPATIENT)
Dept: PHARMACY | Age: 85
Setting detail: THERAPIES SERIES
Discharge: HOME | End: 2020-06-08
Payer: MEDICARE

## 2020-01-01 ENCOUNTER — HOSPITAL ENCOUNTER (OUTPATIENT)
Dept: PHARMACY | Age: 85
Setting detail: THERAPIES SERIES
Discharge: HOME | End: 2020-05-08
Payer: MEDICARE

## 2020-01-01 ENCOUNTER — HOSPITAL ENCOUNTER (OUTPATIENT)
Dept: PHARMACY | Age: 85
Setting detail: THERAPIES SERIES
Discharge: HOME | End: 2020-07-13
Payer: MEDICARE

## 2020-01-01 ENCOUNTER — HOSPITAL ENCOUNTER (OUTPATIENT)
Dept: PHARMACY | Age: 85
Setting detail: THERAPIES SERIES
Discharge: HOME | End: 2020-03-12
Payer: MEDICARE

## 2020-01-01 ENCOUNTER — HOSPITAL ENCOUNTER (OUTPATIENT)
Dept: PHARMACY | Age: 85
Setting detail: THERAPIES SERIES
Discharge: HOME | End: 2020-09-28
Payer: MEDICARE

## 2020-01-01 ENCOUNTER — HOSPITAL ENCOUNTER (OUTPATIENT)
Dept: PHARMACY | Age: 85
Setting detail: THERAPIES SERIES
Discharge: HOME | End: 2020-06-23
Payer: MEDICARE

## 2020-01-01 VITALS
SYSTOLIC BLOOD PRESSURE: 114 MMHG | DIASTOLIC BLOOD PRESSURE: 62 MMHG | HEART RATE: 89 BPM | BODY MASS INDEX: 27.03 KG/M2 | WEIGHT: 177.8 LBS

## 2020-01-01 VITALS
BODY MASS INDEX: 27.19 KG/M2 | HEART RATE: 87 BPM | DIASTOLIC BLOOD PRESSURE: 67 MMHG | WEIGHT: 178.8 LBS | SYSTOLIC BLOOD PRESSURE: 129 MMHG

## 2020-01-01 LAB
INR BLD: 1.8
INR BLD: 2.1
INR BLD: 2.1
INR BLD: 2.2
INR BLD: 2.3
INR BLD: 2.3
INR BLD: 2.4
INR BLD: 2.7
INR BLD: 2.7
INR BLD: 3.1
INTERNATIONAL NORMALIZATION RATIO, POC: 2.2
INTERNATIONAL NORMALIZATION RATIO, POC: 3
PROTHROMBIN TIME: 25.8 SEC (ref 9.3–12.4)
PROTHROMBIN TIME: 27.3 SEC (ref 9.3–12.4)

## 2020-01-01 PROCEDURE — 85610 PROTHROMBIN TIME: CPT

## 2020-01-01 PROCEDURE — 36415 COLL VENOUS BLD VENIPUNCTURE: CPT

## 2020-01-01 PROCEDURE — 99211 OFF/OP EST MAY X REQ PHY/QHP: CPT

## 2020-01-01 RX ORDER — WARFARIN SODIUM 5 MG/1
TABLET ORAL
Qty: 90 TABLET | Refills: 3 | Status: SHIPPED | OUTPATIENT
Start: 2020-01-01

## 2020-01-02 ENCOUNTER — HOSPITAL ENCOUNTER (OUTPATIENT)
Dept: PHARMACY | Age: 85
Setting detail: THERAPIES SERIES
Discharge: HOME | End: 2020-01-02
Payer: MEDICARE

## 2020-01-02 VITALS
WEIGHT: 179 LBS | DIASTOLIC BLOOD PRESSURE: 61 MMHG | BODY MASS INDEX: 27.22 KG/M2 | HEART RATE: 84 BPM | SYSTOLIC BLOOD PRESSURE: 121 MMHG

## 2020-01-02 LAB — INTERNATIONAL NORMALIZATION RATIO, POC: 2.3

## 2020-01-02 PROCEDURE — 99211 OFF/OP EST MAY X REQ PHY/QHP: CPT

## 2020-01-02 PROCEDURE — 85610 PROTHROMBIN TIME: CPT

## 2020-01-02 RX ORDER — ISOSORBIDE DINITRATE 30 MG/1
30 TABLET ORAL DAILY
COMMUNITY

## 2020-01-02 NOTE — PROGRESS NOTES
[facility]    Patient Findings     Positives:   Change in diet/appetite (HAD SAGATITOKRAUT YESTERDAY)    Negatives:   Signs/symptoms of thrombosis, Signs/symptoms of bleeding, Laboratory test error suspected, Change in health, Change in alcohol use, Change in activity, Upcoming invasive procedure, Emergency department visit, Upcoming dental procedure, Missed doses, Extra doses, Change in medications (DUR CHANGED TO [address] [place] [address]), Hospital admission, Bruising, Other complaints         Patient  reports that he has quit smoking. His smoking use included cigarettes. He has a 60.00 pack-year smoking history. He has never used smokeless tobacco.     Assessment/Plan:  Warfarin indication: AFIB      INR today is therapeutic at 2.3, goal is 2-3    Warfarin Dose: SAME (2.5MG SUN; 5MG ALL OTHER DAYS)    Follow Up: 4 weeks    CALLED AND LEFT VOICEMAIL FOR DAUGHTER, TOR, WITH VITALS, WARFARIN DOSE, AND F/U APPT    Patient understands dosing directions and information discussed. Dosing schedule and follow up appointment given to patient. Patient acknowledges working in consult agreement with pharmacist as referred by his/her physician.     Dennie Flakes PharmD 1/2/2020 2:10 PM

## 2020-01-30 ENCOUNTER — HOSPITAL ENCOUNTER (OUTPATIENT)
Dept: PHARMACY | Age: 85
Setting detail: THERAPIES SERIES
Discharge: HOME | End: 2020-01-30
Payer: MEDICARE

## 2020-01-30 VITALS
WEIGHT: 178.8 LBS | SYSTOLIC BLOOD PRESSURE: 113 MMHG | HEART RATE: 82 BPM | DIASTOLIC BLOOD PRESSURE: 65 MMHG | BODY MASS INDEX: 27.19 KG/M2

## 2020-01-30 LAB — INTERNATIONAL NORMALIZATION RATIO, POC: 1.8

## 2020-01-30 PROCEDURE — 85610 PROTHROMBIN TIME: CPT

## 2020-01-30 PROCEDURE — 99211 OFF/OP EST MAY X REQ PHY/QHP: CPT

## 2020-02-13 NOTE — PROGRESS NOTES
[address] [facility]    Patient Findings     Negatives:   Signs/symptoms of thrombosis, Signs/symptoms of bleeding, Laboratory test error suspected, Change in health, Change in alcohol use, Change in activity, Upcoming invasive procedure, Emergency department visit, Upcoming dental procedure, Missed doses (HARD TO [place]), Extra doses, Change in medications, Change in diet/appetite, Hospital admission, Bruising, Other complaints         Patient  reports that he has quit smoking. His smoking use included cigarettes. He has a 60.00 pack-year smoking history. He has never used smokeless tobacco.     Assessment/Plan:  Warfarin indication: AFIB      INR today is therapeutic at 2.2, goal is 2-3    Warfarin Dose: SAME (5MG DAILY)    Follow Up: 4 weeks    DAUGHTER, TOR, WAS CALLED AND VM WAS LEFT WITH WARFARIN DOSE, VITALS, AND F/U VISIT    Patient understands dosing directions and information discussed. Dosing schedule and follow up appointment given to patient. Patient acknowledges working in consult agreement with pharmacist as referred by his/her physician.     Sudheer Joseph PharmD 2/13/2020 2:59 PM

## 2020-03-12 NOTE — PROGRESS NOTES
[address] [facility]    Patient Findings     Negatives:   Signs/symptoms of thrombosis, Signs/symptoms of bleeding, Laboratory test error suspected, Change in health, Change in alcohol use, Change in activity, Upcoming invasive procedure, Emergency department visit, Upcoming dental procedure, Missed doses, Extra doses, Change in medications, Change in diet/appetite, Hospital admission, Bruising, Other complaints    Comments:   PCP IN [facility]   [address] [facility]         Patient  reports that he has quit smoking. His smoking use included cigarettes. He has a 60.00 pack-year smoking history. He has never used smokeless tobacco.     Assessment/Plan:  Warfarin indication: atrial fibrillation      INR today is therapeutic at 3, goal is 2-3. Warfarin Dose: same (5 mg daily)    Follow Up: 4 weeks    Patient understands dosing directions and information discussed. Dosing schedule and follow up appointment given to patient. Patient acknowledges working in consult agreement with pharmacist as referred by his/her physician.   I CALLED AND LEFT A VOICEMAIL FOR HIS DAUGHTER, TOR, REGARDING WARFARIN DOSING DIRECTIONS, VITALS, AND NEXT APPT.     Chapis Nieto PharmD 3/12/2020 1:57 PM

## 2020-04-08 NOTE — TELEPHONE ENCOUNTER
Spoke to Teja Ross. INR will be drawn tomorrow am and result faxed over tomorrow afternoon. May call result and warfarin dosing orders to Teja Ross at [phone].   Barnabas Frankel, PharmD 4/8/2020 4:03 PM

## 2020-07-27 NOTE — PROGRESS NOTES
[facility] LAB DRAW     <<< GIVEN TO RN  [place]  >>>    Assessment/Plan:     Warfarin indication: atrial fibrillation      Location of INR draw: [facility]    INR on 7/27/20 was therapeutic at 2.1, goal is 2-3    Warfarin Dose: 5mg daily    Follow Up: 2 weeks at [facility]     Warfarin Dosing Instructions and Follow-Up Given To: [facility], RN @ [facility] @ [phone], and left message for daughter Jess Pate @ [phone]. Patient understands dosing directions and information discussed. Dosing schedule and follow up appointment given to patient. Patient acknowledges working in consult agreement with pharmacist as referred by his/her physician.     Bala Man PharmD 7/27/2020 2:44 PM

## 2020-08-31 NOTE — PROGRESS NOTES
[facility] LAB DRAW AND F/U TELEPHONE VISIT     <<< VOICEMAIL FOR LISBETH >>>    Assessment/Plan:     Warfarin indication: atrial fibrillation      Location of INR draw: [facility]    INR is therapeutic at 2.3, goal is 2-3. Warfarin Dose: same (5 mg daily)    Follow Up: 4 weeks at [facility]     Warfarin Dosing Instructions and Follow-Up Given To: Chris Giordano ([phone]), RN, @ [facility] and daughter, Lisbeth    Patient understands dosing directions and information discussed. Dosing schedule and follow up appointment given to patient. Patient acknowledges working in consult agreement with pharmacist as referred by his/her physician. Yinka Seaman PharmKHADIJAH 8/31/2020 4:06 PM      CLINICAL PHARMACY CONSULT: MED RECONCILIATION/REVIEW ADDENDUM    For Pharmacy Admin Tracking Only    PHSO: No  Total # of Interventions Recommended: 0    - Maintenance Safety Lab Monitoring #: 1  Recommended intervention potential cost savings:   Accepted intervention potential cost savings:    Total Interventions Accepted: 0  Time Spent (min): [address] [place] [address], PharmD

## 2020-10-26 NOTE — PROGRESS NOTES
[facility] LAB DRAW AND F/U TELEPHONE VISIT    <<< ORDERS GIVEN TO NASEEM >>>      Assessment/Plan:     Warfarin indication: atrial fibrillation      Location of INR draw: [facility]    INR is therapeutic at 2.4, goal is 2-3. Warfarin Dose: same (5 mg daily)    Follow Up: 4 weeks at [facility]     Warfarin Dosing Instructions and Follow-Up Given To: Raffi Tellez, @ [facility] @ [phone] and also left a VM for his daughter, Yaya Mathews    Patient understands dosing directions and information discussed. Dosing schedule and follow up appointment given to patient. Patient acknowledges working in consult agreement with pharmacist as referred by his/her physician. Arlys Siemens PharmD 10/26/2020 3:43 PM      CLINICAL PHARMACY CONSULT: MED RECONCILIATION/REVIEW ADDENDUM    For Pharmacy Admin Tracking Only    PHSO: No  Total # of Interventions Recommended: 0    - Maintenance Safety Lab Monitoring #: 1  Recommended intervention potential cost savings:   Accepted intervention potential cost savings:    Total Interventions Accepted: 0  Time Spent (min): [address] [place] [address], PharmD

## 2020-11-24 NOTE — PROGRESS NOTES
[facility] LAB DRAW AND F/U TELEPHONE VISIT     <<< SPOKE TO NASEEM @ [address] [place] [address]  >>>    Assessment/Plan:     Warfarin indication: atrial fibrillation      Location of INR draw: [facility]    INR is SUPRAtherapeutic at 3.1, goal is 2-3. Warfarin Dose: HOLD TODAY THEN RESUME 5 MG DAILY    Follow Up: 2 weeks at [facility]     Warfarin Dosing Instructions and Follow-Up Given To: Karl Kim, @ [facility] @ [phone] and also left a VM for his daughter, Maximo Celeste    Patient understands dosing directions and information discussed. Dosing schedule and follow up appointment given to patient. Patient acknowledges working in consult agreement with pharmacist as referred by his/her physician. Washington Vizcaino PharmD 11/24/2020 3:54 PM    CLINICAL PHARMACY CONSULT: MED RECONCILIATION/REVIEW ADDENDUM    For Pharmacy Admin Tracking Only    PHSO: No  Total # of Interventions Recommended: 1  - Decreased Dose #: 1  - Maintenance Safety Lab Monitoring #: 1  Recommended intervention potential cost savings:   Accepted intervention potential cost savings:    Total Interventions Accepted: 1  Time Spent (min): [address], PharmD

## 2020-12-07 NOTE — PROGRESS NOTES
[facility] LAB DRAW AND F/U TELEPHONE VISIT      <<< SPOKE TO NASEEM @ [address] [place] [address]  >>>     Assessment/Plan:      Warfarin indication: atrial fibrillation       Location of INR draw: [facility]     INR is therapeutic at 2.8, goal is 2-3.     Warfarin Dose: same (5 mg daily)     Follow Up: 3 weeks at [facility]     Warfarin Dosing Instructions and Follow-Up Given To: Lynette Man, @ [facility] [phone] FABY also left a VM for his daughter, Gerhardt Moores: [facility] Tracking Only    PHSO: No  Total # of Interventions Recommended: 0    - Maintenance Safety Lab Monitoring #: 1  Recommended intervention potential cost savings:   Accepted intervention potential cost savings:    Total Interventions Accepted: 0  Time Spent (min): [address], PharmD

## 2020-12-28 NOTE — PROGRESS NOTES
[facility] LAB DRAW AND F/U TELEPHONE VISIT     <<SPOKE TO NASEEM @ [address],[address] [place] [address]>>>    Assessment/Plan:     Warfarin indication: atrial fibrillation        Location of INR draw: [facility]    INR on 12/28 was SUPRAtherapeutic at 3.6, goal is 2-3. NO NEW MEDICATIONS PER RN    Warfarin Dose: HOLD TODAY, THEN RETRY SAME DOSE (5mg daily)    Follow Up: 1 weeks at [facility]     Warfarin Dosing Instructions and Follow-Up Given To: Jose Ferguson, RN @ [phone] and left VM for daughter, Sayda Woodruff     Patient understands dosing directions and information discussed. Dosing schedule and follow up appointment given to patient. Patient acknowledges working in consult agreement with pharmacist as referred by his/her physician. Dylan Bailon PharmD 12/28/2020 2:12 PM    CLINICAL PHARMACY CONSULT: MED RECONCILIATION/REVIEW ADDENDUM    For Pharmacy Admin Tracking Only    PHSO: No  Total # of Interventions Recommended: 1  - Decreased Dose #: 1  - Maintenance Safety Lab Monitoring #: 1  Recommended intervention potential cost savings:   Accepted intervention potential cost savings:    Total Interventions Accepted: 1  Time Spent (min): 15    Dylan Bailon, GwenD

## 2021-01-01 ENCOUNTER — HOSPITAL ENCOUNTER (OUTPATIENT)
Dept: PHARMACY | Age: 86
Setting detail: THERAPIES SERIES
Discharge: HOME | End: 2021-01-18
Payer: MEDICARE

## 2021-01-01 ENCOUNTER — HOSPITAL ENCOUNTER (OUTPATIENT)
Dept: PHARMACY | Age: 86
Setting detail: THERAPIES SERIES
Discharge: HOME | End: 2021-01-04
Payer: MEDICARE

## 2021-01-01 DIAGNOSIS — I48.0 PAROXYSMAL ATRIAL FIBRILLATION (HCC): ICD-10-CM

## 2021-01-04 NOTE — PROGRESS NOTES
[facility] LAB DRAW AND F/U TELEPHONE VISIT     <<< SPOKE TO NASEEM @ [address] [place] [address]  >>>    Assessment/Plan:     Warfarin indication: atrial fibrillation      Location of INR draw: [facility]     INR on is therapeutic at 2.3, goal is 2-3. Warfarin Dose: same (5 mg daily)    Follow Up: 2 weeks at [facility]     Warfarin Dosing Instructions and Follow-Up Given To: Susie Huff, @ [facility] [phone] DY also spoke to his daughter, Laron Diego    Patient understands dosing directions and information discussed. Dosing schedule and follow up appointment given to patient. Patient acknowledges working in consult agreement with pharmacist as referred by his/her physician. Francine Frank PharmD 1/4/2021 12:57 PM    CLINICAL PHARMACY CONSULT: MED RECONCILIATION/REVIEW ADDENDUM    For Pharmacy Admin Tracking Only    PHSO: No  Total # of Interventions Recommended: 0    - Maintenance Safety Lab Monitoring #: 1  Recommended intervention potential cost savings:   Accepted intervention potential cost savings:    Total Interventions Accepted: 0  Time Spent (min): [address], PharmD

## 2021-01-18 NOTE — PROGRESS NOTES
[facility] LAB DRAW AND F/U TELEPHONE VISIT     <<<SPOKE TO NASEEM AT [facility]>>>    Assessment/Plan:     Warfarin indication: atrial fibrillation      Location of INR draw: [facility]    INR is therapeutic at 2.9, goal is 2-3. Warfarin Dose: same (5 mg daily)    Follow Up: 4 weeks at [facility]     Warfarin Dosing Instructions and Follow-Up Given To: YUOK PALMA @ [facility] ([phone]) and  left for Lisbeth, patient's daughter    Patient understands dosing directions and information discussed. Dosing schedule and follow up appointment given to patient. Patient acknowledges working in consult agreement with pharmacist as referred by his/her physician. Robert Betancur PharmD 1/18/2021 1:29 PM    CLINICAL PHARMACY CONSULT: MED RECONCILIATION/REVIEW ADDENDUM    For Pharmacy Admin Tracking Only    PHSO: No  Total # of Interventions Recommended: 0    - Maintenance Safety Lab Monitoring #: 1  Recommended intervention potential cost savings:   Accepted intervention potential cost savings:    Total Interventions Accepted: 0  Time Spent (min): 323 [address], PharmD